# Patient Record
Sex: OTHER/UNKNOWN | Race: WHITE | NOT HISPANIC OR LATINO | Employment: OTHER | URBAN - NONMETROPOLITAN AREA
[De-identification: names, ages, dates, MRNs, and addresses within clinical notes are randomized per-mention and may not be internally consistent; named-entity substitution may affect disease eponyms.]

---

## 2019-08-05 ENCOUNTER — OFFICE VISIT (OUTPATIENT)
Dept: URGENT CARE | Facility: CLINIC | Age: 54
End: 2019-08-05

## 2019-08-05 VITALS
WEIGHT: 386.6 LBS | OXYGEN SATURATION: 95 % | HEART RATE: 67 BPM | TEMPERATURE: 98.3 F | SYSTOLIC BLOOD PRESSURE: 144 MMHG | RESPIRATION RATE: 18 BRPM | DIASTOLIC BLOOD PRESSURE: 80 MMHG | BODY MASS INDEX: 55.35 KG/M2 | HEIGHT: 70 IN

## 2019-08-05 DIAGNOSIS — K61.1 PERIRECTAL ABSCESS: ICD-10-CM

## 2019-08-05 PROCEDURE — 99204 OFFICE O/P NEW MOD 45 MIN: CPT | Performed by: PHYSICIAN ASSISTANT

## 2019-08-05 RX ORDER — MONTELUKAST SODIUM 10 MG/1
20 TABLET ORAL DAILY
COMMUNITY

## 2019-08-05 RX ORDER — AMOXICILLIN AND CLAVULANATE POTASSIUM 875; 125 MG/1; MG/1
1 TABLET, FILM COATED ORAL 2 TIMES DAILY
Qty: 20 TAB | Refills: 0 | Status: SHIPPED | OUTPATIENT
Start: 2019-08-05 | End: 2019-08-15

## 2019-08-05 ASSESSMENT — ENCOUNTER SYMPTOMS
VOMITING: 0
PALPITATIONS: 0
FEVER: 0
FATIGUE: 0
CHILLS: 0
NAUSEA: 1
ABDOMINAL PAIN: 0
JOINT SWELLING: 0
COUGH: 0
SHORTNESS OF BREATH: 0
ANOREXIA: 0
MYALGIAS: 0

## 2019-08-05 NOTE — PROGRESS NOTES
"Subjective:      Stefano Chadwick is a 54 y.o. adult who presents with Cyst            Cyst   This is a new (abscess to anal region) problem. Episode onset: 4 days  The problem occurs constantly. The problem has been unchanged (patient states it drained yesterday.). Associated symptoms include nausea. Pertinent negatives include no abdominal pain, anorexia, chest pain, chills, coughing, fatigue, fever, joint swelling, myalgias, rash or vomiting. Nothing aggravates the symptoms. Stefano Chadwick has tried nothing for the symptoms.     Past Medical History:   Diagnosis Date   • Asthma    • Malignant hyperthermia        Past Surgical History:   Procedure Laterality Date   • HERNIA REPAIR      belly button       No family history on file.    No Known Allergies    Medications, Allergies, and current problem list reviewed today in Epic      Review of Systems   Constitutional: Negative for chills, fatigue, fever and malaise/fatigue.   Respiratory: Negative for cough and shortness of breath.    Cardiovascular: Negative for chest pain and palpitations.   Gastrointestinal: Positive for nausea. Negative for abdominal pain, anorexia and vomiting.   Musculoskeletal: Negative for joint swelling and myalgias.   Skin: Negative for rash.        Abscess in rectal region     All other systems reviewed and are negative.        Objective:     /80 (BP Location: Left arm, Patient Position: Sitting, BP Cuff Size: Large adult long)   Pulse 67   Temp 36.8 °C (98.3 °F)   Resp 18   Ht 1.765 m (5' 9.5\")   Wt (!) 175.4 kg (386 lb 9.6 oz)   SpO2 95%   BMI 56.27 kg/m²      Physical Exam   Constitutional: Stefano Chadwick is oriented to person, place, and time. Stefano Chadwick appears well-developed. No distress.   obese   HENT:   Head: Normocephalic and atraumatic.   Eyes: Conjunctivae are normal.   Pulmonary/Chest: Effort normal. No respiratory distress.   Genitourinary: Rectal exam shows no external hemorrhoid, no fissure and no mass.  "         Neurological: Stefano Chadwick is alert and oriented to person, place, and time. No cranial nerve deficit.   Psychiatric: Stefano Chadwick has a normal mood and affect. Stefano Chadwick's behavior is normal. Judgment and thought content normal.               Assessment/Plan:     1. Perirectal abscess    - No I&D necessary- actively draining. No signs of edema/induration  - amoxicillin-clavulanate (AUGMENTIN) 875-125 MG Tab; Take 1 Tab by mouth 2 times a day for 10 days.  Dispense: 20 Tab; Refill: 0     Differential diagnoses, Supportive care, and indications for immediate follow-up discussed with patient.   Instructed to return to clinic or nearest emergency department for any change in condition, further concerns, or worsening of symptoms.    The patient demonstrated a good understanding and agreed with the treatment plan.    Neena Flowers P.A.-C.